# Patient Record
Sex: MALE | Race: WHITE | Employment: UNEMPLOYED | URBAN - METROPOLITAN AREA
[De-identification: names, ages, dates, MRNs, and addresses within clinical notes are randomized per-mention and may not be internally consistent; named-entity substitution may affect disease eponyms.]

---

## 2018-04-25 NOTE — H&P
[address] [facility]  [facility] [address], [address] [facility]  [phone]                     History and Physical     NAME: Ryley Bettencourt   :  1950   MRN:  [id number]     HPI:   74year old male who presents with a complaint of Constipation. Reason for encounter: for routine follow-up. The onset of the constipation has been chronic and has been occurring in a persistent pattern. The course has been constant. The stools are hard stools, and not of normal consistency The symptoms are relieved by laxatives (partially). The symptoms have been associated with nausea, while the symptoms have not been associated with bleeding per rectum, intermittent diarrhea or vomiting. The constipation is characterized as not well controlled. The patient has been using laxatives and polyethylene glycol (PEG) (Miralax). Previous diagnostic tests have not included colonoscopy. Note for \"Constipation\": Marianela Kirkland Patient is a 75 yo M, presenting to the clinic due to a hx of chronic constipation, consisting of 1 BM every 3 days. Sxs are partially relieved by stool softners and Miralax. Other associated sxs include nausea, postprandial hiccups, and dark colored stools, but not black. Denies rectal bleeding and vomiting sxs. Previous evaluations have not included EGD and Colonoscopy. Pt is currently on Aspirin and Plavix, and has previously not stopped his anticogulant for prior procedures. No past surgical history on file. No past medical history on file. Social History   Substance Use Topics    Smoking status: Not on file    Smokeless tobacco: Not on file    Alcohol use Not on file     Allergies not on file  No family history on file. No current facility-administered medications for this encounter. No current outpatient prescriptions on file. PHYSICAL EXAM:  General: WD, WN. Alert, cooperative, no acute distress    HEENT: NC, Atraumatic. PERRLA, EOMI. Anicteric sclerae.   Lungs:  CTA Bilaterally. No Wheezing/Rhonchi/Rales. Heart:  Regular  rhythm,  No murmur, No Rubs, No Gallops  Abdomen: Soft, Non distended, Non tender.  +Bowel sounds, no HSM  Extremities: No c/c/e  Neurologic:  CN 2-12 gi, Alert and oriented X 3. No acute neurological distress   Psych:   Good insight. Not anxious nor agitated. Assessment:   I have reviewed with the patient +/- family alternatives,benefits and risks for the procedure, as well as potential complications(with emphasis on, but not limited to, bleeding, perforation, cardiovascular/cerebrovascular/pulmonary events, reactions to the medications, infection, risk of missing a lesions/a cancer, and the imponderables including death), alternative options, and patient/family voices understanding.       Plan:   · Endoscopic procedure  · Conscious sedation or MAC

## 2018-04-26 ENCOUNTER — ANESTHESIA (OUTPATIENT)
Dept: ENDOSCOPY | Age: 68
End: 2018-04-26
Payer: MEDICARE

## 2018-04-26 ENCOUNTER — HOSPITAL ENCOUNTER (OUTPATIENT)
Age: 68
Setting detail: OUTPATIENT SURGERY
Discharge: HOME | End: 2018-04-26
Attending: INTERNAL MEDICINE | Admitting: INTERNAL MEDICINE
Payer: MEDICARE

## 2018-04-26 ENCOUNTER — ANESTHESIA EVENT (OUTPATIENT)
Dept: ENDOSCOPY | Age: 68
End: 2018-04-26
Payer: MEDICARE

## 2018-04-26 VITALS
HEART RATE: 58 BPM | HEIGHT: 73 IN | OXYGEN SATURATION: 98 % | BODY MASS INDEX: 26.51 KG/M2 | DIASTOLIC BLOOD PRESSURE: 54 MMHG | WEIGHT: 200 LBS | TEMPERATURE: 97.5 F | RESPIRATION RATE: 18 BRPM | SYSTOLIC BLOOD PRESSURE: 133 MMHG

## 2018-04-26 LAB
GLUCOSE BLD STRIP.AUTO-MCNC: 121 MG/DL (ref 65–100)
SERVICE CMNT-IMP: ABNORMAL

## 2018-04-26 PROCEDURE — 76060000031 HC ANESTHESIA FIRST 0.5 HR: Performed by: INTERNAL MEDICINE

## 2018-04-26 PROCEDURE — 74011250636 HC RX REV CODE- 250/636

## 2018-04-26 PROCEDURE — 77030013992 HC SNR POLYP ENDOSC BSC -B: Performed by: INTERNAL MEDICINE

## 2018-04-26 PROCEDURE — 74011000250 HC RX REV CODE- 250

## 2018-04-26 PROCEDURE — 88305 TISSUE EXAM BY PATHOLOGIST: CPT | Performed by: INTERNAL MEDICINE

## 2018-04-26 PROCEDURE — 77030009426 HC FCPS BIOP ENDOSC BSC -B: Performed by: INTERNAL MEDICINE

## 2018-04-26 PROCEDURE — 76040000019: Performed by: INTERNAL MEDICINE

## 2018-04-26 PROCEDURE — 82962 GLUCOSE BLOOD TEST: CPT

## 2018-04-26 PROCEDURE — 74011250636 HC RX REV CODE- 250/636: Performed by: INTERNAL MEDICINE

## 2018-04-26 RX ORDER — PROPOFOL 10 MG/ML
INJECTION, EMULSION INTRAVENOUS AS NEEDED
Status: DISCONTINUED | OUTPATIENT
Start: 2018-04-26 | End: 2018-04-26 | Stop reason: HOSPADM

## 2018-04-26 RX ORDER — LIDOCAINE HYDROCHLORIDE 20 MG/ML
INJECTION, SOLUTION EPIDURAL; INFILTRATION; INTRACAUDAL; PERINEURAL AS NEEDED
Status: DISCONTINUED | OUTPATIENT
Start: 2018-04-26 | End: 2018-04-26 | Stop reason: HOSPADM

## 2018-04-26 RX ORDER — ASPIRIN 81 MG/1
TABLET ORAL DAILY
COMMUNITY

## 2018-04-26 RX ORDER — EPINEPHRINE 0.1 MG/ML
1 INJECTION INTRACARDIAC; INTRAVENOUS
Status: DISCONTINUED | OUTPATIENT
Start: 2018-04-26 | End: 2018-04-26 | Stop reason: HOSPADM

## 2018-04-26 RX ORDER — LEVOTHYROXINE SODIUM 50 UG/1
TABLET ORAL
COMMUNITY

## 2018-04-26 RX ORDER — ATORVASTATIN CALCIUM 40 MG/1
TABLET, FILM COATED ORAL
COMMUNITY

## 2018-04-26 RX ORDER — CLOPIDOGREL BISULFATE 75 MG/1
TABLET ORAL DAILY
COMMUNITY

## 2018-04-26 RX ORDER — NALOXONE HYDROCHLORIDE 0.4 MG/ML
0.4 INJECTION, SOLUTION INTRAMUSCULAR; INTRAVENOUS; SUBCUTANEOUS
Status: DISCONTINUED | OUTPATIENT
Start: 2018-04-26 | End: 2018-04-26 | Stop reason: HOSPADM

## 2018-04-26 RX ORDER — ATROPINE SULFATE 0.1 MG/ML
0.5 INJECTION INTRAVENOUS
Status: DISCONTINUED | OUTPATIENT
Start: 2018-04-26 | End: 2018-04-26 | Stop reason: HOSPADM

## 2018-04-26 RX ORDER — VALSARTAN 80 MG/1
TABLET ORAL DAILY
COMMUNITY

## 2018-04-26 RX ORDER — PROPOFOL 10 MG/ML
INJECTION, EMULSION INTRAVENOUS
Status: DISCONTINUED | OUTPATIENT
Start: 2018-04-26 | End: 2018-04-26 | Stop reason: HOSPADM

## 2018-04-26 RX ORDER — METFORMIN HYDROCHLORIDE 1000 MG/1
1000 TABLET ORAL 2 TIMES DAILY WITH MEALS
COMMUNITY

## 2018-04-26 RX ORDER — SODIUM CHLORIDE 9 MG/ML
50 INJECTION, SOLUTION INTRAVENOUS CONTINUOUS
Status: DISCONTINUED | OUTPATIENT
Start: 2018-04-26 | End: 2018-04-26 | Stop reason: HOSPADM

## 2018-04-26 RX ORDER — DEXTROMETHORPHAN/PSEUDOEPHED 2.5-7.5/.8
1.2 DROPS ORAL
Status: DISCONTINUED | OUTPATIENT
Start: 2018-04-26 | End: 2018-04-26 | Stop reason: HOSPADM

## 2018-04-26 RX ORDER — LISINOPRIL 5 MG/1
TABLET ORAL DAILY
COMMUNITY

## 2018-04-26 RX ORDER — FLUMAZENIL 0.1 MG/ML
0.2 INJECTION INTRAVENOUS
Status: DISCONTINUED | OUTPATIENT
Start: 2018-04-26 | End: 2018-04-26 | Stop reason: HOSPADM

## 2018-04-26 RX ORDER — MIDAZOLAM HYDROCHLORIDE 1 MG/ML
.25-5 INJECTION, SOLUTION INTRAMUSCULAR; INTRAVENOUS
Status: DISCONTINUED | OUTPATIENT
Start: 2018-04-26 | End: 2018-04-26 | Stop reason: HOSPADM

## 2018-04-26 RX ADMIN — LIDOCAINE HYDROCHLORIDE 40 MG: 20 INJECTION, SOLUTION EPIDURAL; INFILTRATION; INTRACAUDAL; PERINEURAL at 08:15

## 2018-04-26 RX ADMIN — PROPOFOL 125 MCG/KG/MIN: 10 INJECTION, EMULSION INTRAVENOUS at 08:05

## 2018-04-26 RX ADMIN — PROPOFOL 100 MG: 10 INJECTION, EMULSION INTRAVENOUS at 08:05

## 2018-04-26 RX ADMIN — SODIUM CHLORIDE 50 ML/HR: 900 INJECTION, SOLUTION INTRAVENOUS at 07:41

## 2018-04-26 NOTE — PERIOP NOTES
Patient tolerated procedure without problems. Abdomen soft and patient arousable and voices no complaints Report received from CRNA, see anesthesia note. Patient transported to endoscopy recovery area.   Endoscope was pre-cleaned at bedside immediately following procedure by Wendy SALGADO

## 2018-04-26 NOTE — ANESTHESIA POSTPROCEDURE EVALUATION
Post-Anesthesia Evaluation and Assessment    Patient: Ez Barros MRN: [id number]  SSN: [id number]    YOB: 1950  Age: 74 y.o. Sex: male       Cardiovascular Function/Vital Signs  Visit Vitals    /49    Pulse 62    Temp 36.8 °C (98.2 °F)    Resp 16    Ht 6' 1\" (1.854 m)    Wt 90.7 kg (200 lb)    SpO2 97%    BMI 26.39 kg/m2       Patient is status post MAC anesthesia for Procedure(s):  COLONOSCOPY  ENDOSCOPIC POLYPECTOMY. Nausea/Vomiting: None    Postoperative hydration reviewed and adequate. Pain:  Pain Scale 1: Numeric (0 - 10) (04/26/18 0834)  Pain Intensity 1: 0 (04/26/18 0834)   Managed    Neurological Status: At baseline    Mental Status and Level of Consciousness: Arousable    Pulmonary Status:   O2 Device: Room air (04/26/18 0834)   Adequate oxygenation and airway patent    Complications related to anesthesia: None    Post-anesthesia assessment completed.  No concerns    Signed By: Richa Alfred MD     April 26, 2018

## 2018-04-26 NOTE — DISCHARGE INSTRUCTIONS
[address] [facility]  [facility], [address] [facility]  [phone]                   Kerri Pacheco  [id number]  1950    COLON DISCHARGE INSTRUCTIONS    DISCOMFORT:  Redness at IV site- apply warm compress to area; if redness or soreness persist- contact your physician  There may be a slight amount of blood passed from the rectum  Gaseous discomfort- walking, belching will help relieve any discomfort  You may not operate a vehicle for 12 hours  You may not  engage in an occupation involving machinery or appliances for rest of today  You may not  drink alcoholic beverages for at least 12 hours  Avoid making any critical decisions for at least 24 hour    DIET:   High fiber diet. - however -  remember your colon is empty and a heavy meal will produce gas. Avoid these foods:  vegetables, fried / greasy foods, carbonated drinks for today     ACTIVITY:  It is recommended that you spend the remainder of the day resting -  avoid any strenuous activity. CALL M.D. ANY SIGN OF:   Increasing pain, nausea, vomiting  Abdominal distension (swelling)  New increased bleeding (oral or rectal)  Fever (chills)  Pain in chest area  Bloody discharge from nose or mouth  Shortness of breath    You may resume medications    Post procedure diagnosis:  5 Colon polyps removed      Follow-up Instructions:  Repeat colonoscopy in 3 years. May resume Plavix tomorrow    If a specimen was collected, you will receive a letter with the result by mail within two  weeks. Depending on the result this letter will specify your follow up colonoscopy date.       Please call us for any questions or concerns                     Kerri Pacheco  [id number]  1950        DISCHARGE SUMMARY from Nurse    The following personal items collected during your admission are returned to you:   Dental Appliance: Dental Appliances: None  Vision: Visual Aid: None  Hearing Aid:    Jewelry:    Clothing:    Other Valuables: Valuables sent to safe:

## 2018-04-26 NOTE — PROCEDURES
[address]  [address] [facility], [address] [facility]       [phone]                   2018                EGD Operative Report  Jorge Query  :  1950  [employer] Medical Record Number:  [id number]      Indication:  Dyspepsia-acid, nausea     : Yin Velazquez MD    Referring Provider:  Raúl Wood MD      Anesthesia/Sedation:  MAC anesthesia Propofol    Airway assessment: No airway problems anticipated    Pre-Procedural Exam:      Airway: clear, no airway problems anticipated  Heart: RRR, without gallops or rubs  Lungs: clear bilaterally without wheezes, crackles, or rhonchi  Abdomen: soft, nontender, nondistended, bowel sounds present  Mental Status: awake, alert and oriented to person, place and time       Procedure Details     After infomed consent was obtained for the procedure, with all risks and benefits of procedure explained the patient was taken to the endoscopy suite and placed in the left lateral decubitus position. Following sequential administration of sedation as per above, the endoscope was inserted into the mouth and advanced under direct vision to second portion of the duodenum. A careful inspection was made as the gastroscope was withdrawn, including a retroflexed view of the proximal stomach; findings and interventions are described below. Findings:   Esophagus: Mild LA grade A esophagitis. Bx taken  Stomach: normal mucosa. Small sliding hiatal hernia. Bx taken  Duodenum/jejunum: normal mucosa. Bx taken for histology    Therapies:   biopsy of esophagus  biopsy of stomach pre pyloric antrum    Specimens:  As above           Complications:   None; patient tolerated the procedure well. EBL:  None. Impression:   Hiatal hernia                          LA grade mild esophagitis    Recommendations:    -Acid suppression with a proton pump inhibitor. ,   -Await AIDAN test result and treat for Helicobacter pylori if positive. , -GERD diet: avoid fried and fatty foods.  peppermint, chocolate, alcohol, coffee, citrus fruits and juices, tomoato products; avoid lying down for 2 to 3 hours after eating.  -No NSAIDs  -Colonoscopy today      Tonny Ritter MD

## 2018-04-26 NOTE — IP AVS SNAPSHOT
[address] [facility] [address] [facility] 
[phone] Patient: Francis García MRN: [id number] QTM:8/69/7821 About your hospitalization You were admitted on:  April 26, 2018 You last received care in the:  [facility] ENDOSCOPY You were discharged on:  April 26, 2018 Why you were hospitalized Your primary diagnosis was:  Not on File Follow-up Information None Discharge Orders None A check asha indicates which time of day the medication should be taken. My Medications CONTINUE taking these medications Instructions Each Dose to Equal  
 Morning Noon Evening Bedtime  
 aspirin delayed-release 81 mg tablet Your last dose was: Your next dose is: Take  by mouth daily. atorvastatin 40 mg tablet Commonly known as:  LIPITOR Your last dose was: Your next dose is: Take  by mouth nightly. clopidogrel 75 mg Tab Commonly known as:  PLAVIX Your last dose was: Your next dose is: Take  by mouth daily. COQ10 (UBIQUINOL) PO Your last dose was: Your next dose is: Take  by mouth daily. levothyroxine 50 mcg tablet Commonly known as:  SYNTHROID Your last dose was: Your next dose is: Take  by mouth Daily (before breakfast). lisinopril 5 mg tablet Commonly known as:  Carolyn Drought Your last dose was: Your next dose is: Take  by mouth daily. metFORMIN 1,000 mg tablet Commonly known as:  GLUCOPHAGE Your last dose was: Your next dose is: Take 1,000 mg by mouth two (2) times daily (with meals). 1000 mg  
    
   
   
   
  
 valsartan 80 mg tablet Commonly known as:  DIOVAN  
   
 Your last dose was: Your next dose is: Take  by mouth daily. Discharge Instructions [address] [facility] [address] [facility], [address] [facility] 
[phone] Dwight Reed 
[id number] 
1950 COLON DISCHARGE INSTRUCTIONS DISCOMFORT: 
Redness at IV site- apply warm compress to area; if redness or soreness persist- contact your physician There may be a slight amount of blood passed from the rectum Gaseous discomfort- walking, belching will help relieve any discomfort You may not operate a vehicle for 12 hours You may not  engage in an occupation involving machinery or appliances for rest of today You may not  drink alcoholic beverages for at least 12 hours Avoid making any critical decisions for at least 24 hour DIET: 
 High fiber diet.  however -  remember your colon is empty and a heavy meal will produce gas. Avoid these foods:  vegetables, fried / greasy foods, carbonated drinks for today ACTIVITY: It is recommended that you spend the remainder of the day resting -  avoid any strenuous activity. CALL M.D. ANY SIGN OF: Increasing pain, nausea, vomiting Abdominal distension (swelling) New increased bleeding (oral or rectal) Fever (chills) Pain in chest area Bloody discharge from nose or mouth Shortness of breath You may resume medications Post procedure diagnosis:  5 Colon polyps removed Follow-up Instructions: 
Repeat colonoscopy in 3 years. May resume Plavix tomorrow If a specimen was collected, you will receive a letter with the result by mail within two  weeks. Depending on the result this letter will specify your follow up colonoscopy date. Please call us for any questions or concerns Dwight Reed 
[id number] 
1950 DISCHARGE SUMMARY from Nurse The following personal items collected during your admission are returned to you:  
Dental Appliance: Dental Appliances: None Vision: Visual Aid: None Hearing Aid:   
Jewelry:   
Clothing:   
Other Valuables:   
Valuables sent to safe:   
 
  
  
  
Introducing [facility]! [facility] introduces Gemidis patient portal. Now you can access parts of your medical record, email your doctor's office, and request medication refills online. 1. In your internet browser, go to [email or web address] 2. Click on the First Time User? Click Here link in the Sign In box. You will see the New Member Sign Up page. 3. Enter your Gemidis Access Code exactly as it appears below. You will not need to use this code after youve completed the sign-up process. If you do not sign up before the expiration date, you must request a new code. · Gemidis Access Code: [email or web address] Expires: 7/25/2018  6:46 AM 
 
4. Enter the last four digits of your Social Security Number (xxxx) and Date of Birth (mm/dd/yyyy) as indicated and click Submit. You will be taken to the next sign-up page. 5. Create a Gemidis ID. This will be your Gemidis login ID and cannot be changed, so think of one that is secure and easy to remember. 6. Create a Gemidis password. You can change your password at any time. 7. Enter your Password Reset Question and Answer. This can be used at a later time if you forget your password. 8. Enter your e-mail address. You will receive e-mail notification when new information is available in [address] 9. Click Sign Up. You can now view and download portions of your medical record. 10. Click the Download Summary menu link to download a portable copy of your medical information. If you have questions, please visit the Frequently Asked Questions section of the Gemidis website. Remember, Gemidis is NOT to be used for urgent needs. For medical emergencies, dial 911. Now available from your iPhone and Android! Introducing Ernie Verma As a [employer] [facility] patient, I wanted to make you aware of our electronic visit tool called Ernie Verma. Serviceful allows you to connect within minutes with a medical provider 24 hours a day, seven days a week via a mobile device or tablet or logging into a secure website from your computer. You can access Ernie Baerfin from anywhere in the [place]. A virtual visit might be right for you when you have a simple condition and feel like you just dont want to get out of bed, or cant get away from work for an appointment, when your regular [facility] provider is not available (evenings, weekends or holidays), or when youre out of town and need minor care. Electronic visits cost only $49 and if the [employer] provider determines a prescription is needed to treat your condition, one can be electronically transmitted to a nearby pharmacy*. Please take a moment to enroll today if you have not already done so. The enrollment process is free and takes just a few minutes. To enroll, please download the Serviceful haresh to your tablet or phone, or visit [email or web address] to enroll on your computer. And, as an [address] [place] [address] patient with a [employer] account, the results of your visits will be scanned into your electronic medical record and your primary care provider will be able to view the scanned results. We urge you to continue to see your regular [facility] provider for your ongoing medical care. And while your primary care provider may not be the one available when you seek a Ernie Martidesirefin virtual visit, the peace of mind you get from getting a real diagnosis real time can be priceless. For more information on Ernie Figueroadesirefin, view our Frequently Asked Questions (FAQs) at [email or web address]. Sincerely, 
 
Quinton Pineda MD 
Chief Medical Officer Jeb Boykin *:  certain medications cannot be prescribed via Ernie Verma Providers Seen During Your Hospitalization Provider Specialty Primary office phone Allyson Reddy MD Gastroenterology [phone] Your Primary Care Physician (PCP) Primary Care Physician Office Phone Office Fax [facility], Anirudh Hicks 94 [phone] You are allergic to the following No active allergies Recent Documentation Height Weight BMI Smoking Status 1.854 m 90.7 kg 26.39 kg/m2 Former Smoker Emergency Contacts Name Discharge Info Relation Home Work Mobile Jorge A Mccann CAREGIVER [3] Girlfriend [18] [phone] Patient Belongings The following personal items are in your possession at time of discharge: 
  Dental Appliances: None  Visual Aid: None Please provide this summary of care documentation to your next provider. Signatures-by signing, you are acknowledging that this After Visit Summary has been reviewed with you and you have received a copy. Patient Signature:  ____________________________________________________________ Date:  ____________________________________________________________  
  
[facility] Provider Signature:  ____________________________________________________________ Date:  ____________________________________________________________

## 2018-04-26 NOTE — ANESTHESIA PREPROCEDURE EVALUATION
Anesthetic History   No history of anesthetic complications            Review of Systems / Medical History  Patient summary reviewed, nursing notes reviewed and pertinent labs reviewed    Pulmonary  Within defined limits                 Neuro/Psych         TIA  Pertinent negatives: No CVA   Cardiovascular    Hypertension: well controlled          CAD      Comments: 3 stents placed in 2011  NO CP  Last cardiologist appt stable  Stopped plavix 2 days ago  ASA this am   GI/Hepatic/Renal                Endo/Other    Diabetes: type 2  Hypothyroidism: well controlled       Other Findings   Comments:            Physical Exam    Airway  Mallampati: II  TM Distance: 4 - 6 cm  Neck ROM: normal range of motion   Mouth opening: Normal     Cardiovascular  Regular rate and rhythm,  S1 and S2 normal,  no murmur, click, rub, or gallop  Rhythm: regular  Rate: normal         Dental    Dentition: Caps/crowns and Upper partial plate     Pulmonary  Breath sounds clear to auscultation               Abdominal  GI exam deferred       Other Findings            Anesthetic Plan    ASA: 3  Anesthesia type: MAC          Induction: Intravenous  Anesthetic plan and risks discussed with: Patient

## 2018-04-26 NOTE — PROCEDURES
[address] [facility] [address]  [address] [facility], [address] [facility]  [phone]                   Colonoscopy Operative Report      Indications:    Constipation     :  Mary Jones MD    Referring Provider: Rachel Ahn MD    Sedation:  MAC anesthesia Propofol    Procedure Details:  After informed consent was obtained with all risks and benefits of procedure explained and preoperative exam completed, the patient was taken to the endoscopy suite and placed in the left lateral decubitus position. Upon sequential sedation as per above, a digital rectal exam was performed  And was normal.  The Olympus videocolonoscope  was inserted in the rectum and carefully advanced to the cecum, which was identified by the ileocecal valve and appendiceal orifice, terminal ileum. The quality of preparation was excellent. The colonoscope was slowly withdrawn with careful evaluation between folds. Retroflexion in the rectum was performed and was normal..     Findings:   Rectum: normal  Sigmoid: 3  Sessile polyp(s), the largest 7 mm in size; Descending Colon: 2  Sessile polyp(s), the largest 5 mm in size;  Transverse Colon: normal  Ascending Colon: normal  Cecum: normal  Terminal Ileum: normal    Interventions:  5 complete polypectomy were performed using cold snare  and the polyps were  retrieved    Specimen Removed:  specimen #1, 8 mm in size, located in the descending colon and the sigmoid removed by cold snare and retrieved for pathology - total of  5 polyps removed    Complications: None. EBL:  None. Recommendations:   -Await pathology. -Repeat colonoscopy in 3 years.  -High fiber diet.     -Resume normal medication(s)  -Call office for pathology results 1-2 weks    Discharge Disposition:  Home in the company of a  when able to ambulate.     Mary Jones MD  4/26/2018  8:30 AM

## 2018-04-26 NOTE — ROUTINE PROCESS
Kate [facility]  1950  [id number]    Situation:  Verbal report received from: Ramon Wesley RN  Procedure: Procedure(s):  COLONOSCOPY  ENDOSCOPIC POLYPECTOMY    Background:    Preoperative diagnosis: CONSTIPATION   Postoperative diagnosis: * No post-op diagnosis entered *    :  Dr. Caitie Drummond  Assistant(s): Endoscopy Technician-1: Katya Van  Endoscopy RN-1: Ramon Wesley RN    Specimens:   ID Type Source Tests Collected by Time Destination   1 : Polyps sigmoid and descending colon Preservative   Ozzy Sousa MD 4/26/2018 2316 Pathology     H. Pylori  no    Assessment:  Intra-procedure medications     Anesthesia gave intra-procedure sedation and medications, see anesthesia flow sheet yes    Intravenous fluids: NS@ KVO     Vital signs stable     Abdominal assessment: round and soft     Recommendation:  Discharge patient per MD order. Family or Friend   Permission to share finding with family or friend yes    Endoscopy discharge instructions have been reviewed and given to patient and girlfriend. The patient and girlfriend verbalized understanding and acceptance of instructions.

## 2021-02-01 ENCOUNTER — OFFICE VISIT (OUTPATIENT)
Dept: NEUROLOGY | Age: 71
End: 2021-02-01
Payer: MEDICARE

## 2021-02-01 VITALS
TEMPERATURE: 97.2 F | HEIGHT: 72 IN | RESPIRATION RATE: 16 BRPM | BODY MASS INDEX: 27.09 KG/M2 | SYSTOLIC BLOOD PRESSURE: 132 MMHG | OXYGEN SATURATION: 99 % | HEART RATE: 80 BPM | DIASTOLIC BLOOD PRESSURE: 80 MMHG | WEIGHT: 200 LBS

## 2021-02-01 DIAGNOSIS — I67.82 CEREBRAL ISCHEMIA: ICD-10-CM

## 2021-02-01 DIAGNOSIS — E13.49 OTHER SPECIFIED DIABETES MELLITUS WITH OTHER DIABETIC NEUROLOGICAL COMPLICATION (HCC): ICD-10-CM

## 2021-02-01 DIAGNOSIS — R41.9 COGNITIVE COMPLAINTS: ICD-10-CM

## 2021-02-01 DIAGNOSIS — R41.9 COGNITIVE COMPLAINTS: Primary | ICD-10-CM

## 2021-02-01 PROCEDURE — 99205 OFFICE O/P NEW HI 60 MIN: CPT | Performed by: PSYCHIATRY & NEUROLOGY

## 2021-02-01 NOTE — LETTER
2/1/2021 Patient: Azul Parker YOB: 1950 Date of Visit: 2/1/2021 Tata Santana MD 
[address] [facility] [phone] [phone] [address] Fax: [phone] Dear Tata Santana MD, Thank you for referring Mr. Azul Parker to [facility] for evaluation. My notes for this consultation are attached. If you have questions, please do not hesitate to call me. I look forward to following your patient along with you. Sincerely, Nathan Bergman MD

## 2021-02-01 NOTE — PROGRESS NOTES
Chief Complaint   Patient presents with    New Patient     c/o short-term memory issues.     Memory Loss     Visit Vitals  /80 (BP 1 Location: Right upper arm, BP Patient Position: Sitting)   Pulse 80   Temp 97.2 °F (36.2 °C) (Temporal)   Resp 16   Ht 6' (1.829 m)   Wt 90.7 kg (200 lb)   SpO2 99%   BMI 27.12 kg/m²

## 2021-02-01 NOTE — PROGRESS NOTES
NEUROLOGY NEW PATIENT OFFICE CONSULTATION      2021    RE: Christine Reyes         1950      REFERRED BY:  Peña Bain MD        CHIEF COMPLAINT:  This is Christine Reyes is a 78 y.o. 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 HPI:     For the past 1 yr, patient's son and girlfriend noted cognitive issues, described as forgetting conversations, problem with remembering names, misplaces things, still able to take care of finances, girlfriend helps with appointment, still independent in ADLs    (-) depression  Only sleeps 4 hrs/ night. (-) hallucinations  (-) loss of hygeine    ROS   (-) fever  (-) rash  All other systems reviewed and are negative    Past Medical Hx  Past Medical History:   Diagnosis Date    Adverse effect of anesthesia     difficulty urinating after orthopedic surgery    CAD (coronary artery disease)     Diabetes ([facility] [address]     Hypertension     Stroke ([facility] [address] 2016    TIA-residual memory loss    Thyroid disease    Last TIA was 4 yrs ago  CAD with 3 stents    Social Hx  Social History     Socioeconomic History    Marital status:      Spouse name: Not on file    Number of children: Not on file    Years of education: Not on file    Highest education level: Not on file   Tobacco Use    Smoking status: Former Smoker     Quit date: 1978     Years since quittin.8    Smokeless tobacco: Never Used   Substance and Sexual Activity    Alcohol use: Yes     Alcohol/week: 7.0 - 14.0 standard drinks     Types: 7 - 14 Glasses of wine per week    Drug use: No   2 glasses wine/ night  Finished high school with special ed    Family Hx  History reviewed. No pertinent family history.    Mother - dementia in her 76s    ALLERGIES  No Known Allergies    CURRENT MEDS  Current Outpatient Medications   Medication Sig Dispense Refill    metFORMIN (GLUCOPHAGE) 1,000 mg tablet Take 1,000 mg by mouth two (2) times daily (with meals).  valsartan (DIOVAN) 80 mg tablet Take  by mouth daily.  atorvastatin (LIPITOR) 40 mg tablet Take  by mouth nightly.  clopidogrel (PLAVIX) 75 mg tab Take  by mouth daily.  lisinopril (PRINIVIL, ZESTRIL) 5 mg tablet Take  by mouth daily.  levothyroxine (SYNTHROID) 50 mcg tablet Take  by mouth Daily (before breakfast).  aspirin delayed-release 81 mg tablet Take  by mouth daily.  COQ10, UBIQUINOL, PO Take  by mouth daily. PREVIOUS WORKUP: (reviewed)  IMAGING:    CT Results (recent):  No results found for this or any previous visit. MRI Results (recent):  No results found for this or any previous visit. IR Results (recent):  No results found for this or any previous visit. VAS/US Results (recent):  No results found for this or any previous visit. LABS (reviewed)  Results for orders placed or performed during the hospital encounter of 04/26/18   GLUCOSE, POC   Result Value Ref Range    Glucose (POC) 121 (H) 65 - 100 mg/dL    Performed by Rodrigo Marie        Physical Exam:     Visit Vitals  /80 (BP 1 Location: Right upper arm, BP Patient Position: Sitting)   Pulse 80   Temp 97.2 °F (36.2 °C) (Temporal)   Resp 16   Ht 6' (1.829 m)   Wt 90.7 kg (200 lb)   SpO2 99%   BMI 27.12 kg/m²     General:  Alert, cooperative, no distress. Head:  Normocephalic, without obvious abnormality, atraumatic. Eyes:  Conjunctivae/corneas clear. Lungs:  Heart:   Non labored breathing  Regular rate and rhythm, no carotid bruits   Abdomen:   Soft, non-distended   Extremities: Extremities normal, atraumatic, no cyanosis or edema. Pulses: 2+ and symmetric all extremities. Skin: Skin color, texture, turgor normal. No rashes or lesions.   Neurologic Exam     Gen:  MMSE 24/30 Attention normal             Language: naming, repetition, fluency normal             Memory:2/3  Problem with spelling  Cranial Nerves:  I: smell Not tested   II: visual fields Full to confrontation   II: pupils Equal, round, reactive to light   II: optic disc No papilledema   III,VII: ptosis none   III,IV,VI: extraocular muscles  Full ROM   V: mastication normal   V: facial light touch sensation  normal   VII: facial muscle function   symmetric   VIII: hearing symmetric   IX: soft palate elevation  normal   XI: trapezius strength  5/5   XI: sternocleidomastoid strength 5/5   XI: neck flexion strength  5/5   XII: tongue  midline     Motor: normal bulk and tone, no tremor              Strength: 5/5 all four extremities  Sensory: intact to LT, PP, vibration, and JPS  Reflexes: 1+ throughout; Down going toes  Coordination: Good FTN and HTS  Gait: normal gait including tandem            Impression:     Tian Cortes is a 78 y.o. male who  has a past medical history of Adverse effect of anesthesia, CAD (coronary artery disease), Diabetes ([address] Hypertension, Stroke ([facility] [address] (2016), and Thyroid disease. who for the past 1 yr, patient's son and girlfriend noted cognitive issues, described as forgetting conversations, problem with remembering names, misplaces things, still able to take care of finances, girlfriend helps with appointment, still independent in ADLs. MMSE is 24/30. Considerations include mild cognitive impairment in a patient with baseline childhood language disability and vascular dementia (had TIAs). Patient should be evaluated for other metabolic, nutritional and inflammatory causes of his symptoms. RECOMMENDATIONS  1. I had a long discussion with patient. Discussed diagnosis, prognosis, pathophysiology and available treatment. All questions were answered. 2. Blood test for CORAL, ESR, Vit B12, Folate, TSH, Lipid panel, HgbA1c  3. Referral for neuropsychological testing  4. Already taking ASA 81 every day for vascular prevention  5. Already on Lipitor 40 mg every day   6. Optimize medical management of HTN and diabetes c/o PCP  7.   Depending on above, will consider MRI brain        Follow-up and Dispositions    · Return for review of results.             Thank you for the consultation      Kevin Juarez MD  Diplomate, American Board of Psychiatry and Neurology  Diplomate, Neuromuscular Medicine  Diplomate, American Board of Electrodiagnostic Medicine        CC: Daniele Rivera MD  Fax: [phone]

## 2021-02-02 LAB
ANA SER QL: NEGATIVE
CHOLEST SERPL-MCNC: 137 MG/DL (ref 100–199)
ERYTHROCYTE [SEDIMENTATION RATE] IN BLOOD BY WESTERGREN METHOD: 13 MM/HR (ref 0–30)
EST. AVERAGE GLUCOSE BLD GHB EST-MCNC: 197 MG/DL
FOLATE SERPL-MCNC: >20 NG/ML
HBA1C MFR BLD: 8.5 % (ref 4.8–5.6)
HDLC SERPL-MCNC: 30 MG/DL
LDLC SERPL CALC-MCNC: 65 MG/DL (ref 0–99)
TRIGL SERPL-MCNC: 258 MG/DL (ref 0–149)
TSH SERPL DL<=0.005 MIU/L-ACNC: 3.5 UIU/ML (ref 0.45–4.5)
VIT B12 SERPL-MCNC: 685 PG/ML (ref 232–1245)
VLDLC SERPL CALC-MCNC: 42 MG/DL (ref 5–40)

## 2021-05-03 ENCOUNTER — OFFICE VISIT (OUTPATIENT)
Dept: NEUROLOGY | Age: 71
End: 2021-05-03
Payer: MEDICARE

## 2021-05-03 DIAGNOSIS — F41.9 ANXIETY AND DEPRESSION: ICD-10-CM

## 2021-05-03 DIAGNOSIS — G31.84 MILD COGNITIVE IMPAIRMENT: Primary | ICD-10-CM

## 2021-05-03 DIAGNOSIS — F80.9 SPEECH DEVELOPMENTAL DELAY: ICD-10-CM

## 2021-05-03 DIAGNOSIS — R41.3 SHORT-TERM MEMORY LOSS: ICD-10-CM

## 2021-05-03 DIAGNOSIS — F32.A ANXIETY AND DEPRESSION: ICD-10-CM

## 2021-05-03 DIAGNOSIS — Z86.73 HISTORY OF TIAS: ICD-10-CM

## 2021-05-03 DIAGNOSIS — Z87.898 HISTORY OF LEARNING DISABILITY: ICD-10-CM

## 2021-05-03 PROCEDURE — 90791 PSYCH DIAGNOSTIC EVALUATION: CPT | Performed by: CLINICAL NEUROPSYCHOLOGIST

## 2021-05-03 NOTE — PROGRESS NOTES
[address] [facility]  [address]   [place] [address] [facility]   [phone] Office   [phone] Fax      Neuropsychology    Initial Diagnostic Interview Note      Referral:  Christiano Crabtree MD,  Becca Ac is a 78 y.o. right handed  male who was accompanied by his girlfriend to the initial clinical interview on 5/3/21 . Please refer to his medical records for details pertaining to his history. At the start of the appointment, I reviewed the patient's [facility] Epic Chart (including Media scanned in from previous providers) for the active Problem List, all pertinent Past Medical Hx, medications, recent radiologic and laboratory findings. In addition, I reviewed pt's documented Immunization Record and Encounter History. He completed high school and when he was young he had very large adenoids and was in special education classes all the way through high school. He was put in a single classroom with markedly impaired folks and ended up learning more from family than he did in school. He has always struggled with reading and ends not reading very much at all. He has a past history of Adverse effect of anesthesia, CAD (coronary artery disease), Diabetes ([address] Hypertension, Stroke ([address] (2016), and Thyroid disease. For the past year or more, the patient's son and the patient's girlfriend and his other son, too, have all noted progressive decline in his short term memory. He and his gf have been together 4-5 years and it has just been getting worse. He has a hard time remembering conversations. Forgets the content of conversations. Misplaces things. Starts tasks and does not complete. GF helps with appointments and such. He does his ADLs by himself. He did MMSE with Dr. Rosalio Anthony and got 24/50 correct. The patient has a history of TIAs and also a language/speech developmental disorder. 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 He is depressed and anxious. No counseling or psychiatrist.       He is less social now that he is retired and is more anxious and depressed now. He is more irritable in public. He lives in the past.      His mother and his grandmother had dementia. He used to know seaman code and then had a TIA three years ago and since then he cannot keep up and cannot remember seaman code.       Neuropsychological Mental Status Exam (NMSE):      Historian: Good  Praxis: No UE apraxia  R/L Orientation: Intact to self and to other  Dress: within normal limits   Weight: within normal limits   Appearance/Hygiene: within normal limits   Gait: within normal limits   Assistive Devices: None  Mood: within normal limits   Affect: within normal limits   Comprehension: within normal limits   Thought Process: within normal limits   Expressive Language: within normal limits   Receptive Language: within normal limits   Motor:  No cognitive or motor perseveration  ETOH: 7-14 glasses of wine a week  Tobacco: Denied  Illicit: Denied  SI/HI: Denied  Psychosis: Denied  Insight: Within normal limits  Judgment: Within normal limits  Other Psych:      Past Medical History:   Diagnosis Date    Adverse effect of anesthesia     difficulty urinating after orthopedic surgery    CAD (coronary artery disease)     Diabetes ([facility] [address]     Hypertension     Stroke ([facility] [address] 2016    TIA-residual memory loss    Thyroid disease        Past Surgical History:   Procedure Laterality Date    COLONOSCOPY  4/26/2018         COLONOSCOPY N/A 4/26/2018    COLONOSCOPY performed by Priya Sarmiento MD at [address]  4/26/2018         HX HERNIA REPAIR Right     inguinal hernia    HX ORTHOPAEDIC  2008    broken left arm, ribs and nose after fall off roof    [place] CARDIAC SURG PROCEDURE UNLIST  2011    3 stents in heart    UPPER GI ENDOSCOPY,BIOPSY  4/26/2018            No Known Allergies    No family history on file. Social History     Tobacco Use    Smoking status: Former Smoker     Quit date: 1978     Years since quittin.0    Smokeless tobacco: Never Used   Substance Use Topics    Alcohol use: Yes     Alcohol/week: 7.0 - 14.0 standard drinks     Types: 7 - 14 Glasses of wine per week    Drug use: No       Current Outpatient Medications   Medication Sig Dispense Refill    metFORMIN (GLUCOPHAGE) 1,000 mg tablet Take 1,000 mg by mouth two (2) times daily (with meals).  valsartan (DIOVAN) 80 mg tablet Take  by mouth daily.  atorvastatin (LIPITOR) 40 mg tablet Take  by mouth nightly.  clopidogrel (PLAVIX) 75 mg tab Take  by mouth daily.  lisinopril (PRINIVIL, ZESTRIL) 5 mg tablet Take  by mouth daily.  levothyroxine (SYNTHROID) 50 mcg tablet Take  by mouth Daily (before breakfast).  aspirin delayed-release 81 mg tablet Take  by mouth daily.  COQ10, UBIQUINOL, PO Take  by mouth daily. Plan:  Obtain authorization for testing from insurance company. Report to follow once testing, scoring, and interpretation completed. ? Organic based neurocognitive issues versus mood disorder or combination of same. ? Problems organic, functional, or both? This note will not be viewable in [address] He has chronic LD and speech issues. GF seems to think his memory has been getting worse since he retired.

## 2021-06-24 ENCOUNTER — OFFICE VISIT (OUTPATIENT)
Dept: NEUROLOGY | Age: 71
End: 2021-06-24
Payer: MEDICARE

## 2021-06-24 DIAGNOSIS — F41.9 ANXIETY AND DEPRESSION: ICD-10-CM

## 2021-06-24 DIAGNOSIS — F90.0 ATTENTION DEFICIT HYPERACTIVITY DISORDER (ADHD), INATTENTIVE TYPE, MODERATE: ICD-10-CM

## 2021-06-24 DIAGNOSIS — F32.A ANXIETY AND DEPRESSION: ICD-10-CM

## 2021-06-24 DIAGNOSIS — Z86.73 HISTORY OF TIAS: ICD-10-CM

## 2021-06-24 DIAGNOSIS — F80.9 SPEECH DEVELOPMENTAL DELAY: ICD-10-CM

## 2021-06-24 DIAGNOSIS — R41.83 BORDERLINE INTELLECTUAL DISABILITY: ICD-10-CM

## 2021-06-24 DIAGNOSIS — F81.9 LEARNING DISABILITIES: ICD-10-CM

## 2021-06-24 DIAGNOSIS — G93.49 CHRONIC STATIC ENCEPHALOPATHY: Primary | ICD-10-CM

## 2021-06-24 PROCEDURE — 96139 PSYCL/NRPSYC TST TECH EA: CPT | Performed by: CLINICAL NEUROPSYCHOLOGIST

## 2021-06-24 PROCEDURE — 96133 NRPSYC TST EVAL PHYS/QHP EA: CPT | Performed by: CLINICAL NEUROPSYCHOLOGIST

## 2021-06-24 PROCEDURE — 96138 PSYCL/NRPSYC TECH 1ST: CPT | Performed by: CLINICAL NEUROPSYCHOLOGIST

## 2021-06-24 PROCEDURE — 96136 PSYCL/NRPSYC TST PHY/QHP 1ST: CPT | Performed by: CLINICAL NEUROPSYCHOLOGIST

## 2021-06-24 PROCEDURE — 96137 PSYCL/NRPSYC TST PHY/QHP EA: CPT | Performed by: CLINICAL NEUROPSYCHOLOGIST

## 2021-06-24 PROCEDURE — 96132 NRPSYC TST EVAL PHYS/QHP 1ST: CPT | Performed by: CLINICAL NEUROPSYCHOLOGIST

## 2021-06-28 NOTE — PROGRESS NOTES
[address] [facility],[address]  [address]   [address] [place] [address] [facility], [facility]   [phone] Office   [phone] Fax      Neuropsychological Evaluation Report    Referral:  Jaqueline Stein MD, . Ugo Calderón is a 73 y.o. right handed  male who was accompanied by his girlfriend to the initial clinical interview on 5/3/21 . Please refer to his medical records for details pertaining to his history. At the start of the appointment, I reviewed the patient's [facility] Epic Chart (including Media scanned in from previous providers) for the active Problem List, all pertinent Past Medical Hx, medications, recent radiologic and laboratory findings. In addition, I reviewed pt's documented Immunization Record and Encounter History. He completed high school and when he was young he had very large adenoids and was in special education classes all the way through high school. He was put in a single classroom with markedly impaired folks and ended up learning more from family than he did in school. He has always struggled with reading and ends not reading very much at all. He has a past history of Adverse effect of anesthesia, CAD (coronary artery disease), Diabetes ([address] Hypertension, Stroke ([address] (2016), and Thyroid disease. For the past year or more, the patient's son and the patient's girlfriend and his other son, too, have all noted progressive decline in his short term memory. He and his gf have been together 4-5 years and it has just been getting worse. He has a hard time remembering conversations. Forgets the content of conversations. Misplaces things. Starts tasks and does not complete. GF helps with appointments and such. He does his ADLs by himself. He did MMSE with Dr. Bisi Ross and got 24/50 correct. The patient has a history of TIAs and also a language/speech developmental disorder.   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 He is depressed and anxious. No counseling or psychiatrist.     He is less social now that he is retired and is more anxious and depressed now. He is more irritable in public. He lives in the past.    His mother and his grandmother had dementia. He used to know seaman code and then had a TIA three years ago and since then he cannot keep up and cannot remember seaman code.       Neuropsychological Mental Status Exam (NMSE):      Historian: Good  Praxis: No UE apraxia  R/L Orientation: Intact to self and to other  Dress: within normal limits   Weight: within normal limits   Appearance/Hygiene: within normal limits   Gait: within normal limits   Assistive Devices: None  Mood: within normal limits   Affect: within normal limits   Comprehension: within normal limits   Thought Process: within normal limits   Expressive Language: within normal limits   Receptive Language: within normal limits   Motor:  No cognitive or motor perseveration  ETOH: 7-14 glasses of wine a week  Tobacco: Denied  Illicit: Denied  SI/HI: Denied  Psychosis: Denied  Insight: Within normal limits  Judgment: Within normal limits  Other Psych:      Past Medical History:   Diagnosis Date    Adverse effect of anesthesia     difficulty urinating after orthopedic surgery    CAD (coronary artery disease)     [facility]     Hypertension     Stroke ([facility] 2016    TIA-residual memory loss    Thyroid disease        Past Surgical History:   Procedure Laterality Date    COLONOSCOPY  4/26/2018         COLONOSCOPY N/A 4/26/2018    COLONOSCOPY performed by Cindy Heard MD at [address]  4/26/2018         HX HERNIA REPAIR Right     inguinal hernia    HX ORTHOPAEDIC  2008    broken left arm, ribs and nose after fall off roof    AK CARDIAC SURG PROCEDURE UNLIST  2011    3 stents in heart    UPPER GI ENDOSCOPY,BIOPSY  4/26/2018            No Known Allergies    No family history on file. Social History     Tobacco Use    Smoking status: Former Smoker     Quit date: 1978     Years since quittin.2    Smokeless tobacco: Never Used   Vaping Use    Vaping Use: Never used   Substance Use Topics    Alcohol use: Yes     Alcohol/week: 7.0 - 14.0 standard drinks     Types: 7 - 14 Glasses of wine per week    Drug use: No       Current Outpatient Medications   Medication Sig Dispense Refill    metFORMIN (GLUCOPHAGE) 1,000 mg tablet Take 1,000 mg by mouth two (2) times daily (with meals).  valsartan (DIOVAN) 80 mg tablet Take  by mouth daily.  atorvastatin (LIPITOR) 40 mg tablet Take  by mouth nightly.  clopidogrel (PLAVIX) 75 mg tab Take  by mouth daily.  lisinopril (PRINIVIL, ZESTRIL) 5 mg tablet Take  by mouth daily.  levothyroxine (SYNTHROID) 50 mcg tablet Take  by mouth Daily (before breakfast).  aspirin delayed-release 81 mg tablet Take  by mouth daily.  COQ10, UBIQUINOL, PO Take  by mouth daily. Plan:  Obtain authorization for testing from insurance company. Report to follow once testing, scoring, and interpretation completed. ? Organic based neurocognitive issues versus mood disorder or combination of same. ? Problems organic, functional, or both? This note will not be viewable in [facility]. He has chronic LD and speech issues. GF seems to think his memory has been getting worse since he retired.         Neuropsychological Test Results  Patient Testing 21 Report Completed 21  A Psychometrist Assisted w/ portions of this evaluation while under my direct  supervision    The following evaluation procedures/tests were administered:      Neuropsychologist Administered/Interpreted:  Neuropsychological Mental Status Exam, Revised Memory & Behavior Checklist,  Mini Mental Status Exam, Clock Drawing Test, Test Of Premorbid Functioning, Luz Elena-Melzack Pain Questionnaire, History Taking  & Clinical Interview With The Patient, Additional History Taking w/ The Patient's Girlfriend ABAS-3, CASE, YAMIL, CPT-III, Review Of Available Records. Psychometrist Administered under Neuropsychologist Supervision & Neuropsychologist Interpreted:  Verbal Fluency Tests, Matti & Matti  Revised, Trailmaking Test Parts A & B, Wechsler Adult Intelligence Scale - IV, [employer]  3, Grooved Pegboard, Diaz Depression Inventory  II, Diaz Anxiety Inventory. Test Findings:  Test Findings:  Note:  The patients raw data have been compared with currently available norms which include demographic corrections for age, gender, and/or education. Sometimes, the patients scores are compared to demographically similar individuals as close to the patients age, education level, etc., as possible. \"Average\" is viewed as being +/- 1 standard deviation (SD) from the stated mean for a particular test score. \"Low average\" is viewed as being between 1 and 2 SD below the mean, and above average is viewed as being 1 and 2 SD above the mean. Scores falling in the borderline range (between 1-1/2 and 2 SD below the mean) are viewed with particular attention as to whether they are normal or abnormal neurocognitive test scores. Other methods of inference in analyzing the test data are also utilized, including the pattern and range of scores in the profile, bilateral motor functions, and the presence, if any, of pathognomonic signs. Behaviorally, the patient was friendly and cooperative and appeared motivated to perform well during this examination. Within this context, the results of this evaluation are viewed as a valid reflection of the patients actual neurocognitive and emotional status. His MMSE score of 20/30 correct was impaired. In this regard, he was not oriented to day, date, or building. Registration of three words was 2/3 correct. Backward spelling was 1/5 correct. Repetition was 0/1 correct.   Clock drawing was normal.      His structured word list fluency, as assessed by the FAS Test, was within the mildly to moderately impaired range with a T score of 30. Category fluency was average with a T score of 50. Confrontation naming, as assessed by the Matti & Matti as revised, was within the below average range with a T score 42. This pattern performance is not indicative of a patient is at increased risk for day-to-day problems of verbal fluency or confrontation naming. The patient was administered the [facility] Continuous Performance Test  III,a computer administered test of sustained attention, and review of the subscales within this instrument revealed moderate to severe concerns for inattentiveness without impulsivity. This pattern of performance is indicative of a patient who is at increased risk for day-to-day problems with sustained visual attention/concentration. The patient is showing problems with working memory capacity (1st %ile) though processing speed (1 was low average 0th %ile) on the WAIS-IV. His Verbal Comprehension Index score of 61 was within the extremely low range. His Perceptual Reasoning Index score of 86 was low average. These scores reflect a decline in functioning based on an assessment of premorbid functioning. He is always struggled in school, and day-to-day problems with verbal comprehension and working memory can be expected. The patient was administered the [place] Verbal Learning Test  - 3 and generated an impaired range (and flat) learning curve over five repeated auditory word list learning trials. An interference trial was within the low average range. Recall for the original word list was within the low average range after a short delay. It was severely impaired after a long delayed free recall, but then again normal with cues. Recognition recall was also low average. Forced choice recall was normal, suggesting good effort.   This is not suggestive of an organic based memory disorder. Instead, this is evidence of a chronic attention deficit. Simple timed visual motor sequencing (Trailmaking Test Part A) was within the below average range with a T score 40. [place] His performance on a similar, but more complex task of timed visual motor sequencing (Trailmaking Test Part B) was within the low average range with a T score 40. He made 0 sequencing errors on this latter completed test.  Taken together, this pattern performance is not indicative of a patient is at increased risk for day-to-day problems with executive functioning. Fine motor dexterity was within the mildly impaired range bilaterally. This does not raise concern for particularly focal or lateralized brain dysfunction. The patient rated his current level of pain as \"0/5-no pain \" on the Luz Elena-Melzack Pain Questionnaire. His Diaz Depression Inventory- II score of 15 was within the mildly depressed range. His Diaz anxiety inventory score of 8 reflected mild anxiety. I attempted the YAMIL but discontinued this measure due to item comprehension problems. The girlfriend completed the ABAS-3 and did not report clinically significant concerns regarding the patient's general adaptive skills (18th %ile), conceptual skills (14th t %ile), social skills (21st  %ile), or practical skills 23rd %ile). Impressions & Recommendations: This patient generated an abnormal range Neuropsychological Evaluation with respect to neurocognitive functioning. In this regard, he is showing problems with mental status, sustained visual attention, working memory capacity, verbal comprehension, bilateral fine motor dexterity, and general learning abilities. At the same time, his auditory memory, processing speed, perceptual reasoning, and executive functioning abilities remain normal.  From an emotional standpoint, the patient reported mild depression and anxiety.      In my opinion, this profile is consistent with an individual who has chronic intellectual, learning, and attention deficit issues now compounded by age, depression, and anxiety. I do not see evidence of a dementia type issue at this time. I have the patient is reassured by these test results. I do recommend consideration for appropriate medication for his rather severe attention deficit type concerns. This, of course, is only if not medically contraindicated. I also recommend treatment and counseling to assist with depression and anxiety concerns. At present, I have no recommendations regarding any need for additional supervision as these are chronic issues and the patient has made at 73 years of his life without any additional accommodation or assistance. At the same time, as he ages, he may require more supervision in terms of reminders for medications and for finances and such. At present, I find him competent/having capacity to make informed medical decisions, financial decisions, to vote, to , etc.  I would like to see him again in 1 year to track and see if any of those issues are progressing, but they appear static at this time. Clinical correlation is strongly advised in that regard. We now have extensive baseline neurocognitive data on him. Follow-up as noted. Clinical correlation is, course, indicated. I will discuss these findings with the patient and family when they follow up with me in the near future. A follow up Neuropsychological Evaluation is indicated on a prn basis. DIAGNOSES: Chronic Encephalopathy    Anxiety and Depression    Attention Deficit    Learning Disability    Borderline Intellectual Functioning     The above information is based upon information currently available to me. If there is any additional information of which I am currently unaware, I would be more than happy to review it upon having it made available to me.   Thank you for the opportunity to see this interesting individual.     Sincerely,       Viviane Jaime. Brianas Speaks, PsyD, EdS      Attachments:  IQ Test Results (In Media Section Of This EMR)    Cc: Hilad Andrea MD    Time Documentation:    55134*9 77373*7 (60 minutes)    70847 x 1  96139 x 5 Test Administration/Data Gathering By Technician: (3 hours). 89263 x 1 (first 30 minutes), 98200 x 5 (each additional 30 minutes)    96132 x 1  96133 x 1 Testing Evaluation Services by Neuropsychologist (1 hour, 50 minutes) 96132 x 1 (first hour), 96133 x 1 (50 minutes)    Definitions:      09342/42320:  Neurobehavioral Status Exam, Clinical interview. Clinical assessment of thinking, reasoning and judgment, by neuropsychologist, both face to face time with patient and time interpreting those test results and reporting, first and subsequent hours)    85087/90810: Neuropsychological Test Administration by Technician/Psychometrist, first 30 minutes and each additional 30 minutes. The above includes: Record review. Review of history provided by patient. Review of collaborative information. Testing by Clinician. Review of raw data. Scoring. Report writing of individual tests administered by Clinician. Integration of individual tests administered by psychometrist with NSE/testing by clinician, review of records/history/collaborative information, case Conceptualization, treatment planning, clinical decision making, report writing, coordination Of Care. Psychometry test codes as time spent by psychometrist administering and scoring neurocognitive/psychological tests under supervision of neuropsychologist.  Integral services including scoring of raw data, data interpretation, case conceptualization, report writing etcetera were initiated after the patient finished testing/raw data collected and was completed on the date the report was signed. Please note that this dictation was completed with Modusly, the [employer] voice recognition software.   Quite often unanticipated grammatical, syntax, homophones, and other interpretive errors are inadvertently transcribed by the computer software. Please disregard these errors. Please excuse any errors that have escaped final proofreading. Thank you.

## 2021-08-27 ENCOUNTER — OFFICE VISIT (OUTPATIENT)
Dept: NEUROLOGY | Age: 71
End: 2021-08-27
Payer: MEDICARE

## 2021-08-27 DIAGNOSIS — F32.A ANXIETY AND DEPRESSION: ICD-10-CM

## 2021-08-27 DIAGNOSIS — F41.9 ANXIETY AND DEPRESSION: ICD-10-CM

## 2021-08-27 DIAGNOSIS — G93.49 CHRONIC STATIC ENCEPHALOPATHY: Primary | ICD-10-CM

## 2021-08-27 DIAGNOSIS — R41.83 BORDERLINE INTELLECTUAL DISABILITY: ICD-10-CM

## 2021-08-27 DIAGNOSIS — F81.9 LEARNING DISABILITIES: ICD-10-CM

## 2021-08-27 DIAGNOSIS — F90.0 ATTENTION DEFICIT HYPERACTIVITY DISORDER (ADHD), INATTENTIVE TYPE, MODERATE: ICD-10-CM

## 2021-08-27 DIAGNOSIS — F80.9 SPEECH DEVELOPMENTAL DELAY: ICD-10-CM

## 2021-08-27 PROCEDURE — 90832 PSYTX W PT 30 MINUTES: CPT | Performed by: CLINICAL NEUROPSYCHOLOGIST

## 2021-08-27 NOTE — PROGRESS NOTES
Prior to seeing the patient I reviewed the records, including the previously completed report, the records in [place], and any updated visits from other providers since I saw the patient last.      Today, I engaged in a psychoeducational and supportive and cognitive/behavioral psychotherapy session with the patient and his girlfriend. I provided psychotherapy in the form of psychoeducation and support with respect to the results of the recent Neuropsychological Evaluation, including discussing individual tests as well as patient's areas of neurocognitive strength versus weakness. We discussed, in detail, the following: This patient generated an abnormal range Neuropsychological Evaluation with respect to neurocognitive functioning. In this regard, he is showing problems with mental status, sustained visual attention, working memory capacity, verbal comprehension, bilateral fine motor dexterity, and general learning abilities. At the same time, his auditory memory, processing speed, perceptual reasoning, and executive functioning abilities remain normal.  From an emotional standpoint, the patient reported mild depression and anxiety.                 In my opinion, this profile is consistent with an individual who has chronic intellectual, learning, and attention deficit issues now compounded by age, depression, and anxiety. I do not see evidence of a dementia type issue at this time. I have the patient is reassured by these test results. I do recommend consideration for appropriate medication for his rather severe attention deficit type concerns. This, of course, is only if not medically contraindicated. I also recommend treatment and counseling to assist with depression and anxiety concerns.   At present, I have no recommendations regarding any need for additional supervision as these are chronic issues and the patient has made at 73 years of his life without any additional accommodation or assistance. At the same time, as he ages, he may require more supervision in terms of reminders for medications and for finances and such. At present, I find him competent/having capacity to make informed medical decisions, financial decisions, to vote, to , etc.  I would like to see him again in 1 year to track and see if any of those issues are progressing, but they appear static at this time. Clinical correlation is strongly advised in that regard. We now have extensive baseline neurocognitive data on him. Follow-up as noted. Clinical correlation is, course, indicated.                 I will discuss these findings with the patient and family when they follow up with me in the near future. A follow up Neuropsychological Evaluation is indicated on a prn basis.       DIAGNOSES: Chronic Encephalopathy                          Anxiety and Depression                          Attention Deficit                          Learning Disability                          Borderline Intellectual Functioning      Education was provided regarding my diagnostic impressions, and we discussed treatment plan/options. I also answered numerous questions related to the clinical findings, including discussing various methods to improve cognition and mood. Counseling provided regarding mood and cognition. CBT and supportive psychotherapy techniques were utilized. Supportive/Cognitive Behavioral/Solution Focused psychotherapy provided  Discussed rational versus irrational thinking patterns and their consequences. Discussed healthy/adaptive and unhealthy/maladaptive coping. The patient needs to follow with neuro, pcp as needed. Reassuring exam bt needs help with mood and monitor memory over time.       The patient had the following concerns which I deferred to their referring provider: meds      Time spent today: 20

## 2023-01-31 ENCOUNTER — APPOINTMENT (OUTPATIENT)
Dept: GENERAL RADIOLOGY | Age: 73
End: 2023-01-31
Attending: EMERGENCY MEDICINE
Payer: MEDICARE

## 2023-01-31 ENCOUNTER — HOSPITAL ENCOUNTER (EMERGENCY)
Age: 73
Discharge: HOME | End: 2023-01-31
Attending: EMERGENCY MEDICINE
Payer: MEDICARE

## 2023-01-31 VITALS
OXYGEN SATURATION: 95 % | HEART RATE: 90 BPM | SYSTOLIC BLOOD PRESSURE: 134 MMHG | BODY MASS INDEX: 25.87 KG/M2 | HEIGHT: 72 IN | WEIGHT: 191 LBS | TEMPERATURE: 99.8 F | RESPIRATION RATE: 18 BRPM | DIASTOLIC BLOOD PRESSURE: 73 MMHG

## 2023-01-31 DIAGNOSIS — L03.113 CELLULITIS OF RIGHT UPPER EXTREMITY: Primary | ICD-10-CM

## 2023-01-31 LAB
ALBUMIN SERPL-MCNC: 3.7 G/DL (ref 3.5–5)
ALBUMIN/GLOB SERPL: 0.8 (ref 1.1–2.2)
ALP SERPL-CCNC: 78 U/L (ref 45–117)
ALT SERPL-CCNC: 20 U/L (ref 12–78)
ANION GAP SERPL CALC-SCNC: 9 MMOL/L (ref 5–15)
AST SERPL-CCNC: 10 U/L (ref 15–37)
BASOPHILS # BLD: 0 K/UL (ref 0–0.1)
BASOPHILS NFR BLD: 0 % (ref 0–1)
BILIRUB SERPL-MCNC: 0.8 MG/DL (ref 0.2–1)
BUN SERPL-MCNC: 28 MG/DL (ref 6–20)
BUN/CREAT SERPL: 20 (ref 12–20)
CALCIUM SERPL-MCNC: 9.8 MG/DL (ref 8.5–10.1)
CHLORIDE SERPL-SCNC: 100 MMOL/L (ref 97–108)
CO2 SERPL-SCNC: 24 MMOL/L (ref 21–32)
COMMENT, HOLDF: NORMAL
CREAT SERPL-MCNC: 1.37 MG/DL (ref 0.7–1.3)
CRP SERPL-MCNC: 16.1 MG/DL (ref 0–0.6)
DIFFERENTIAL METHOD BLD: ABNORMAL
EOSINOPHIL # BLD: 0 K/UL (ref 0–0.4)
EOSINOPHIL NFR BLD: 0 % (ref 0–7)
ERYTHROCYTE [DISTWIDTH] IN BLOOD BY AUTOMATED COUNT: 13.5 % (ref 11.5–14.5)
ERYTHROCYTE [SEDIMENTATION RATE] IN BLOOD: 59 MM/HR (ref 0–20)
GLOBULIN SER CALC-MCNC: 4.7 G/DL (ref 2–4)
GLUCOSE SERPL-MCNC: 183 MG/DL (ref 65–100)
HCT VFR BLD AUTO: 41.3 % (ref 36.6–50.3)
HGB BLD-MCNC: 13.7 G/DL (ref 12.1–17)
IMM GRANULOCYTES # BLD AUTO: 0.1 K/UL (ref 0–0.04)
IMM GRANULOCYTES NFR BLD AUTO: 1 % (ref 0–0.5)
LACTATE BLD-SCNC: 1.55 MMOL/L (ref 0.4–2)
LYMPHOCYTES # BLD: 1.2 K/UL (ref 0.8–3.5)
LYMPHOCYTES NFR BLD: 10 % (ref 12–49)
MCH RBC QN AUTO: 29 PG (ref 26–34)
MCHC RBC AUTO-ENTMCNC: 33.2 G/DL (ref 30–36.5)
MCV RBC AUTO: 87.3 FL (ref 80–99)
MONOCYTES # BLD: 1.1 K/UL (ref 0–1)
MONOCYTES NFR BLD: 10 % (ref 5–13)
NEUTS SEG # BLD: 9.3 K/UL (ref 1.8–8)
NEUTS SEG NFR BLD: 79 % (ref 32–75)
NRBC # BLD: 0 K/UL (ref 0–0.01)
NRBC BLD-RTO: 0 PER 100 WBC
PLATELET # BLD AUTO: 288 K/UL (ref 150–400)
PMV BLD AUTO: 10 FL (ref 8.9–12.9)
POTASSIUM SERPL-SCNC: 4.5 MMOL/L (ref 3.5–5.1)
PROT SERPL-MCNC: 8.4 G/DL (ref 6.4–8.2)
RBC # BLD AUTO: 4.73 M/UL (ref 4.1–5.7)
SAMPLES BEING HELD,HOLD: NORMAL
SODIUM SERPL-SCNC: 133 MMOL/L (ref 136–145)
WBC # BLD AUTO: 11.7 K/UL (ref 4.1–11.1)

## 2023-01-31 PROCEDURE — 73130 X-RAY EXAM OF HAND: CPT

## 2023-01-31 PROCEDURE — 80053 COMPREHEN METABOLIC PANEL: CPT

## 2023-01-31 PROCEDURE — 83605 ASSAY OF LACTIC ACID: CPT

## 2023-01-31 PROCEDURE — 86140 C-REACTIVE PROTEIN: CPT

## 2023-01-31 PROCEDURE — 85025 COMPLETE CBC W/AUTO DIFF WBC: CPT

## 2023-01-31 PROCEDURE — 99284 EMERGENCY DEPT VISIT MOD MDM: CPT

## 2023-01-31 PROCEDURE — 85652 RBC SED RATE AUTOMATED: CPT

## 2023-01-31 PROCEDURE — 73090 X-RAY EXAM OF FOREARM: CPT

## 2023-01-31 PROCEDURE — 36415 COLL VENOUS BLD VENIPUNCTURE: CPT

## 2023-01-31 RX ORDER — HYDROCODONE BITARTRATE AND ACETAMINOPHEN 5; 325 MG/1; MG/1
1 TABLET ORAL
Qty: 15 TABLET | Refills: 0 | Status: SHIPPED | OUTPATIENT
Start: 2023-01-31 | End: 2023-02-03

## 2023-01-31 NOTE — ED PROVIDER NOTES
Mr. Marco Conklin is a 71yo male who presents to the ER with complaints of arm pain. He said that his symptoms started last week. He was seen 2 days ago at an urgent care where he was started on antibiotics. He received IM ceftriaxone and was started on Omnicef and Bactrim. He has continued to have pain in his hand. He reports that his pain started in his right hand and extended all the way up through his forearm. He had had significant redness and swelling of his hand and arm. He said that over the last 2 days, his swelling and redness have improved some. He even reports that his pain is improved some to. However, the urgent care called him yesterday and said that they thought they may have seen signs of a bone infection on the x-ray. Therefore, he was sent to the ER. Past Medical History:   Diagnosis Date    Adverse effect of anesthesia     difficulty urinating after orthopedic surgery    CAD (coronary artery disease)     Diabetes ([facility] [address]     Hypertension     Stroke ([facility] [address]     TIA-residual memory loss    Thyroid disease        Past Surgical History:   Procedure Laterality Date    COLONOSCOPY  2018         COLONOSCOPY N/A 2018    COLONOSCOPY performed by Joseph Richardson MD at [address] [facility]  2018         HX HERNIA REPAIR Right     inguinal hernia    HX ORTHOPAEDIC  2008    broken left arm, ribs and nose after fall off roof    [place] CARDIAC SURG PROCEDURE UNLIST      3 stents in heart    UPPER GI ENDOSCOPY,BIOPSY  2018              No family history on file.     Social History     Socioeconomic History    Marital status: OTHER     Spouse name: Not on file    Number of children: Not on file    Years of education: Not on file    Highest education level: Not on file   Occupational History    Not on file   Tobacco Use    Smoking status: Former     Types: Cigarettes     Quit date: 1978     Years since quittin.7    Smokeless tobacco: Never   Vaping Use Vaping Use: Never used   Substance and Sexual Activity    Alcohol use: Yes     Alcohol/week: 7.0 - 14.0 standard drinks     Types: 7 - 14 Glasses of wine per week    Drug use: No    Sexual activity: Not on file   Other Topics Concern    Not on file   Social History Narrative    Not on file     Social Determinants of Health     Financial Resource Strain: Not on file   Food Insecurity: Not on file   Transportation Needs: Not on file   Physical Activity: Not on file   Stress: Not on file   Social Connections: Not on file   Intimate Partner Violence: Not on file   Housing Stability: Not on file         ALLERGIES: Patient has no known allergies. Review of Systems   Constitutional:  Negative for chills and fever. HENT:  Negative for rhinorrhea and sore throat. Respiratory:  Negative for cough and shortness of breath. Cardiovascular:  Negative for chest pain. Gastrointestinal:  Negative for abdominal pain, diarrhea, nausea and vomiting. Genitourinary:  Negative for dysuria and hematuria. Musculoskeletal:         Arm pain, hand pain   Skin:  Negative for pallor and rash. Neurological:  Negative for dizziness, weakness and light-headedness. All other systems reviewed and are negative. Vitals:    01/31/23 1119   BP: 134/73   Pulse: 90   Resp: 18   Temp: 99.8 °F (37.7 °C)   SpO2: 95%   Weight: 86.6 kg (191 lb)   Height: 6' (1.829 m)            Physical Exam     Vital signs reviewed. Nursing notes reviewed.     Const:  No acute distress, well developed, well nourished  Head:  Atraumatic, normocephalic  Eyes:  PERRL, conjunctiva normal, no scleral icterus  Neck:  Supple, trachea midline  Cardiovascular: Regular rate  Resp:  No resp distress, no increased work of breathing  Abd:  Soft, non-tender, non-distended  MSK: Swelling of the right hand up to the wrist with significant tenderness palpation diffusely throughout the hand and up through the forearm, very minimal erythema, apparent line was drawn on his arm from the edge of erythema by the urgent care, patient has very minimal to no erythema in this area anymore. Neuro:  Alert and oriented x3, no cranial nerve defect  Skin:  Warm, dry, intact  Psych: normal mood and affect, behavior is normal, judgement and thought content is normal          Medical Decision Making  Amount and/or Complexity of Data Reviewed  Labs: ordered. Radiology: ordered. Risk  Prescription drug management. Mr. Maya Abdul is a 74-year-old male who presents to the ER with arm pain. He was diagnosed with cellulitis 2 days ago in his arm. He has been on antibiotics for 2 days. He has noticed considerable improvement in the erythema, swelling, and pain. He is sent to the ER for possible osteo-. He does have an elevated ESR and CRP, which is relatively nonspecific. Considered admission to the hospital for him. However, he is having clinical improvement. He is currently on antibiotics. I think that he should probably finish his course of antibiotics. He was given very strict instructions to see his primary care doctor later this week or return to the ER with any new or worsening symptoms. He agrees to this.       Procedures

## 2023-01-31 NOTE — ED TRIAGE NOTES
Reports getting an antibiotic shot and two oral prescriptions for arm infection that have not resolved it.

## 2025-05-06 ENCOUNTER — HOSPITAL ENCOUNTER (EMERGENCY)
Facility: HOSPITAL | Age: 75
Discharge: HOME | End: 2025-05-06
Attending: EMERGENCY MEDICINE
Payer: MEDICARE

## 2025-05-06 VITALS
DIASTOLIC BLOOD PRESSURE: 56 MMHG | RESPIRATION RATE: 17 BRPM | TEMPERATURE: 97.3 F | SYSTOLIC BLOOD PRESSURE: 155 MMHG | HEIGHT: 72 IN | BODY MASS INDEX: 27.36 KG/M2 | HEART RATE: 69 BPM | OXYGEN SATURATION: 98 % | WEIGHT: 202 LBS

## 2025-05-06 DIAGNOSIS — R04.0 EPISTAXIS: Primary | ICD-10-CM

## 2025-05-06 LAB
ANION GAP SERPL CALC-SCNC: 7 MMOL/L (ref 2–12)
BASOPHILS # BLD: 0.06 K/UL (ref 0–0.1)
BASOPHILS NFR BLD: 0.7 % (ref 0–1)
BUN SERPL-MCNC: 23 MG/DL (ref 6–20)
BUN/CREAT SERPL: 19 (ref 12–20)
CALCIUM SERPL-MCNC: 9.2 MG/DL (ref 8.5–10.1)
CHLORIDE SERPL-SCNC: 108 MMOL/L (ref 97–108)
CO2 SERPL-SCNC: 25 MMOL/L (ref 21–32)
CREAT SERPL-MCNC: 1.19 MG/DL (ref 0.7–1.3)
DIFFERENTIAL METHOD BLD: ABNORMAL
EOSINOPHIL # BLD: 0.11 K/UL (ref 0–0.4)
EOSINOPHIL NFR BLD: 1.3 % (ref 0–7)
ERYTHROCYTE [DISTWIDTH] IN BLOOD BY AUTOMATED COUNT: 14.5 % (ref 11.5–14.5)
GLUCOSE SERPL-MCNC: 157 MG/DL (ref 65–100)
HCT VFR BLD AUTO: 38.1 % (ref 36.6–50.3)
HGB BLD-MCNC: 12.4 G/DL (ref 12.1–17)
IMM GRANULOCYTES # BLD AUTO: 0.06 K/UL (ref 0–0.04)
IMM GRANULOCYTES NFR BLD AUTO: 0.7 % (ref 0–0.5)
INR PPP: 1 (ref 0.9–1.1)
LYMPHOCYTES # BLD: 1.28 K/UL (ref 0.8–3.5)
LYMPHOCYTES NFR BLD: 14.7 % (ref 12–49)
MCH RBC QN AUTO: 29.6 PG (ref 26–34)
MCHC RBC AUTO-ENTMCNC: 32.5 G/DL (ref 30–36.5)
MCV RBC AUTO: 90.9 FL (ref 80–99)
MONOCYTES # BLD: 0.7 K/UL (ref 0–1)
MONOCYTES NFR BLD: 8 % (ref 5–13)
NEUTS SEG # BLD: 6.49 K/UL (ref 1.8–8)
NEUTS SEG NFR BLD: 74.6 % (ref 32–75)
NRBC # BLD: 0 K/UL (ref 0–0.01)
NRBC BLD-RTO: 0 PER 100 WBC
PLATELET # BLD AUTO: 198 K/UL (ref 150–400)
PMV BLD AUTO: 10.7 FL (ref 8.9–12.9)
POTASSIUM SERPL-SCNC: 4.2 MMOL/L (ref 3.5–5.1)
PROTHROMBIN TIME: 10.7 SEC (ref 9.2–11.2)
RBC # BLD AUTO: 4.19 M/UL (ref 4.1–5.7)
SODIUM SERPL-SCNC: 140 MMOL/L (ref 136–145)
WBC # BLD AUTO: 8.7 K/UL (ref 4.1–11.1)

## 2025-05-06 PROCEDURE — 80048 BASIC METABOLIC PNL TOTAL CA: CPT

## 2025-05-06 PROCEDURE — 85610 PROTHROMBIN TIME: CPT

## 2025-05-06 PROCEDURE — 99283 EMERGENCY DEPT VISIT LOW MDM: CPT

## 2025-05-06 PROCEDURE — 85025 COMPLETE CBC W/AUTO DIFF WBC: CPT

## 2025-05-06 PROCEDURE — 36415 COLL VENOUS BLD VENIPUNCTURE: CPT

## 2025-05-06 ASSESSMENT — ENCOUNTER SYMPTOMS
VOMITING: 0
SHORTNESS OF BREATH: 0

## 2025-05-06 NOTE — ED PROVIDER NOTES
every morning (before breakfast)    LISINOPRIL (PRINIVIL;ZESTRIL) 5 MG TABLET    Take by mouth daily    METFORMIN (GLUCOPHAGE) 1000 MG TABLET    Take 1,000 mg by mouth 2 times daily (with meals)    VALSARTAN (DIOVAN) 80 MG TABLET    Take by mouth daily       ALLERGIES     Patient has no known allergies.    FAMILY HISTORY     No family history on file.       SOCIAL HISTORY       Social History     Socioeconomic History    Marital status: Unknown   Tobacco Use    Smoking status: Former     Current packs/day: 0.00     Types: Cigarettes     Quit date: 1978     Years since quittin.0    Smokeless tobacco: Never   Substance and Sexual Activity    Alcohol use: Yes     Alcohol/week: 7.0 - 14.0 standard drinks of alcohol    Drug use: No         PHYSICAL EXAM       ED Triage Vitals   BP Systolic BP Percentile Diastolic BP Percentile Temp Temp src Pulse Respirations SpO2   25 1254 -- -- 25 1254 -- 25 1254 25 1254 25 1254   (!) 155/72   97.7 °F (36.5 °C)  72 16 98 %      Height Weight - Scale         25 1245 25 1245         1.829 m (6') 91.6 kg (202 lb)             Body mass index is 27.4 kg/m².    Physical Exam  Vitals and nursing note reviewed.   Constitutional:       General: He is not in acute distress.     Appearance: Normal appearance.   HENT:      Head: Normocephalic and atraumatic.      Nose:      Comments: No active nose bleeding on arrival to the ER.  No visible dried blood     Mouth/Throat:      Mouth: Mucous membranes are moist.      Pharynx: Oropharynx is clear. No oropharyngeal exudate.   Eyes:      Extraocular Movements: Extraocular movements intact.      Pupils: Pupils are equal, round, and reactive to light.   Cardiovascular:      Rate and Rhythm: Normal rate and regular rhythm.   Pulmonary:      Effort: Pulmonary effort is normal.      Breath sounds: Normal breath sounds.   Abdominal:      General: Abdomen is flat.      Palpations: Abdomen is soft.

## 2025-05-06 NOTE — ED TRIAGE NOTES
Pt arrives with complaint of 6 nose bleeds in 6 days. Pt takes a baby aspirin daily but no anticoagulants. Nose is not bleeding on arrival triage. Last nose bleed was at 10am today and lasted 40 minutes.

## 2025-05-06 NOTE — ED NOTES
DC paperwork reviewed, prt verbalized understanding. IV remvoed, Pt A/O x4, ambualtory at time of DC, no distress noted.

## 2025-05-06 NOTE — DISCHARGE INSTRUCTIONS
Please follow-up with an ENT specialist regarding your daily nosebleeds.  I recommend using Afrin nasal spray as needed for nosebleeds at home.  You can  this medication at the pharmacy.

## 2025-05-06 NOTE — ED NOTES
DC paperwork reviewed, pt verbalized understanding. IV removed. Pt A/O x4, ambulatory at time of DC, no distress noted.

## (undated) DEVICE — KIT COLON W/ 1.1OZ LUB AND 2 END

## (undated) DEVICE — SOLIDIFIER MEDC 1200ML -- CONVERT TO 356117

## (undated) DEVICE — FORCEPS BX L240CM JAW DIA2.8MM L CAP W/ NDL MIC MESH TOOTH

## (undated) DEVICE — BASIN EMSIS 16OZ GRAPHITE PLAS KID SHP MOLD GRAD FOR ORAL

## (undated) DEVICE — ADULT SPO2 SENSOR: Brand: NELLCOR

## (undated) DEVICE — BAG BELONG PT PERS CLEAR HANDL

## (undated) DEVICE — BAG SPEC BIOHZRD 10 X 10 IN --

## (undated) DEVICE — SNARE ENDOSCP M L240CM W27MM SHTH DIA2.4MM CHN 2.8MM OVL

## (undated) DEVICE — CATH IV AUTOGRD BC BLU 22GA 25 -- INSYTE

## (undated) DEVICE — CONTAINER SPEC 20 ML LID NEUT BUFF FORMALIN 10 % POLYPR STS

## (undated) DEVICE — CUFF RMFG BP INF SZ 11 DISP -- LAWSON OEM ITEM 238915

## (undated) DEVICE — SET ADMIN 16ML TBNG L100IN 2 Y INJ SITE IV PIGGY BK DISP

## (undated) DEVICE — NDL PRT INJ NSAF BLNT 18GX1.5 --

## (undated) DEVICE — Device

## (undated) DEVICE — NDL FLTR TIP 5 MIC 18GX1.5IN --

## (undated) DEVICE — KENDALL RADIOLUCENT FOAM MONITORING ELECTRODE -RECTANGULAR SHAPE: Brand: KENDALL

## (undated) DEVICE — TRAP SUC MUCOUS 70ML -- MEDICHOICE MEDLINE

## (undated) DEVICE — SYR 5ML 1/5 GRAD LL NSAF LF --

## (undated) DEVICE — 1200 GUARD II KIT W/5MM TUBE W/O VAC TUBE: Brand: GUARDIAN

## (undated) DEVICE — SYR 3ML LL TIP 1/10ML GRAD --